# Patient Record
Sex: MALE | Race: WHITE
[De-identification: names, ages, dates, MRNs, and addresses within clinical notes are randomized per-mention and may not be internally consistent; named-entity substitution may affect disease eponyms.]

---

## 2019-07-30 ENCOUNTER — HOSPITAL ENCOUNTER (EMERGENCY)
Dept: HOSPITAL 10 - FTE | Age: 59
LOS: 1 days | Discharge: HOME | End: 2019-07-31
Payer: MEDICAID

## 2019-07-30 ENCOUNTER — HOSPITAL ENCOUNTER (EMERGENCY)
Dept: HOSPITAL 91 - FTE | Age: 59
LOS: 1 days | Discharge: HOME | End: 2019-07-31
Payer: MEDICAID

## 2019-07-30 VITALS
HEIGHT: 69 IN | WEIGHT: 231.71 LBS | WEIGHT: 231.71 LBS | HEIGHT: 69 IN | BODY MASS INDEX: 34.32 KG/M2 | BODY MASS INDEX: 34.32 KG/M2

## 2019-07-30 DIAGNOSIS — Z23: ICD-10-CM

## 2019-07-30 DIAGNOSIS — S61.431A: Primary | ICD-10-CM
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
## 2019-07-30 PROCEDURE — 90471 IMMUNIZATION ADMIN: CPT

## 2019-07-30 PROCEDURE — 73130 X-RAY EXAM OF HAND: CPT

## 2019-07-30 PROCEDURE — 90715 TDAP VACCINE 7 YRS/> IM: CPT

## 2019-07-30 PROCEDURE — 99283 EMERGENCY DEPT VISIT LOW MDM: CPT

## 2019-07-30 RX ADMIN — CLOSTRIDIUM TETANI TOXOID ANTIGEN (FORMALDEHYDE INACTIVATED), CORYNEBACTERIUM DIPHTHERIAE TOXOID ANTIGEN (FORMALDEHYDE INACTIVATED), BORDETELLA PERTUSSIS TOXOID ANTIGEN (GLUTARALDEHYDE INACTIVATED), BORDETELLA PERTUSSIS FILAMENTOUS HEMAGGLUTININ ANTIGEN (FORMALDEHYDE INACTIVATED), BORDETELLA PERTUSSIS PERTACTIN ANTIGEN, AND BORDETELLA PERTUSSIS FIMBRIAE 2/3 ANTIGEN 1 ML: 5; 2; 2.5; 5; 3; 5 INJECTION, SUSPENSION INTRAMUSCULAR at 23:24

## 2019-07-30 RX ADMIN — HYDROCODONE BITARTRATE AND ACETAMINOPHEN 1 TAB: 5; 325 TABLET ORAL at 23:23

## 2019-07-31 VITALS — HEART RATE: 56 BPM | DIASTOLIC BLOOD PRESSURE: 80 MMHG | RESPIRATION RATE: 18 BRPM | SYSTOLIC BLOOD PRESSURE: 134 MMHG

## 2019-08-06 NOTE — ERD
ER Documentation


Chief Complaint


Chief Complaint





R HAND PUNCTURE WOUND FROM NAIL WHILE WORKING





HPI


58-year-old male presents emergency department complaining of puncture to the 


right hand from a nail which occurred while working earlier today.  He is right-


hand dominant.  His pain is rated 8/10 severity and intermittent.  He took 


ibuprofen with some relief.  He did not clean out the wound.  His tetanus is not


up-to-date.





ROS


All systems reviewed and are negative except as per history of present illness.





Medications


Home Meds


Active Scripts


Ibuprofen* (Motrin*) 600 Mg Tab, 600 MG PO Q6, #30 TAB


   Prov:LIZA COLE PA-C         19


Cephalexin* (Keflex*) 500 Mg Capsule, 500 MG PO QID for 5 Days, CAP


   Prov:LIZA COLE PA-C         19





Allergies


Allergies:  


Coded Allergies:  


     No Known Allergy (Unverified , 19)





PMhx/Soc


Medical and Surgical Hx:  pt denies Medical Hx, pt denies Surgical Hx


Hx Alcohol Use:  No


Hx Substance Use:  No


Hx Tobacco Use:  No


Smoking Status:  Never smoker





FmHx


Family History:  No diabetes





Physical Exam


Physical Exam


Const:   No acute distress


Head:   Atraumatic 


Eyes:    Normal Conjunctiva


ENT:    Normal External Ears, Nose and Mouth.


Neck:               Full range of motion. No meningismus.


Resp:   Clear to auscultation bilaterally


Cardio:   Regular rate and rhythm, no murmurs


Abd:    Soft, non tender, non distended. Normal bowel sounds


Skin:   No petechiae or rashes


Back:   No midline or flank tenderness


Ext:    Puncture wound noted to the dorsum of the right hand.  No active 


bleeding.  No obvious foreign body.


Neur:   Awake and alert


Psych:    Normal Mood and Affect


Results 24 hrs





Current Medications


 Medications
   Dose
          Sig/Alix
       Start Time
   Status  Last


 (Trade)       Ordered        Route
 PRN     Stop Time              Admin
Dose


                              Reason                                Admin


                1 tab          ONCE  ONCE
    19       DC           19


Acetaminophen                 PO
            23:30
                       23:23



/
                                           19 23:31


Hydrocodone


Bitart



(Norco


(5/325))


 Diphtheria/
   0.5 ml         ONCE ONCE
     19       DC           19


Tetanus/Acell                 IM*
           23:30
                       23:24




 Pertussis
                                 19 23:31


(Adacel)


[address] [address]


                        Radiology Main Line: [phone]





                            DIAGNOSTIC IMAGING REPORT





Patient: EDUARD MUNOZ   : 1960   Age: 58  Sex: M                


       


       MR #:    [id number]   [id number]    DOS: 19 0000


Ordering MD: LIZA COLE PA-C   Location:  [facility]   Room/Bed:             


                              


                                        


PROCEDURE:   XR Hand. 


 


CLINICAL INDICATION:   Right hand pain. 


 


TECHNIQUE:   AP, lateral and oblique views of the right hands were obtained. 


 


COMPARISON:   No prior studies are available for comparison. 


 


FINDINGS:


The distal radius and ulna are normal in appearance.  The radiocarpal joint is 


maintained.  The carpal bones and intercarpal joints are normal in appearance.  


The first carpometacarpal joint is normal.  There is no periarticular osteopenia


. The metacarpals and phalanges are normal in appearance.  The 


metacarpophalangeal and interphalangeal joints are normal.  There is mild 


subcutaneous emphysema over the dorsum of the hand. There is no abnormal 


calcification. There is no evidence of fracture.


 


IMPRESSION:


1. Subcutaneous emphysema over the dorsum of the hand soft tissues. 


2. No evidence of fracture.


 


RPTAT: HGAS


_____________________________________________ 


.Devin Moore MD, MD           Date    Time 


Electronically viewed and signed by .Devin Moore MD, MD on 2019 


00:10 


 


D:  2019 00:10  T:  2019 00:10


.S/





CC: LIZA COLE PA-C





[phone]








Procedures/MDM


58-year-old male presenting to the emergency department complaining of puncture 


wound of the right hand with a nail which occurred just prior to arrival.  Since


Tdap was updated.  X-ray was ordered which showed no acute osseous abnormality. 


The full report interpreted by the radiologist may be viewed above.  Patient is 


otherwise stable for discharge and further outpatient management with a 


prescription for Keflex and ibuprofen.  Patient advised to have close primary 


care follow-up and return here immediately for any new or worsening or concer


dakota symptoms.  The patient was in agreement with the diagnosis, plan, need for 


follow-up, return precautions.





Departure


Diagnosis:  


   Primary Impression:  


   Puncture wound


Condition:  Fair


Patient Instructions:  Puncture Wound, General


Referrals:  


[facility]


Usted se ha hecho un examen mdico de control que le indica que no est en chino 


condicin que requiera tratamiento urgente en el Departamento de Emergencia. Un 


estudio ms profundo y el tratamiento de garcia condicin pueden esperar sin ningn 


riesgo hasta que usted sea atendida/o en el consultorio de garcia mdico o chino 


clnica. Es responsabilidad suya arreglar chino kartik para el seguimiento del amalia.








MANEJO DE CONDICIONES NO URGENTES EN EL FUTURO


1) Si usted tiene un mdico de atencin primaria:





Usted debera llamar a garcia mdico de atencin primaria antes de venir al 


departamento de emergencia. Despus de las horas de consultorio, garcia doctor o garcia 


asociado/a est disponible por telfono. El mdico o enfermero de jo en el 


servicio telefnico puede asesorarle por ede medio para atender el problema, o 


amalia contrario se puede programar chino kartik.





2) Si usted no tiene un mdico de atencin primaria:


Llame al mdico o clnica de referencia que aparece abajo pete las horas de 


consultorio para hacer chino kartik para que le vean.





CLINICAS:


[facility]  [phone] [facility]  [phone] [facility] [phone] [address] [place] [phone]


[address] [place] [phone] [address] [facility]. [phone] 


[address]





Additional Instructions:  


Llame al doctor MAANA y vi chino KARTIK PARA DENTRO DE 1-2 ROBERTSON.Dgale a la 


secretaria que nosotros le instruimos hacer esta kartik.Avise o llame si garcia 


condicin se empeora antes de la kartik. Regresa aqui si peor o no mejor.











LIZA COLE PA-C        Aug 6, 2019 01:58